# Patient Record
Sex: MALE | Race: WHITE | Employment: OTHER | URBAN - METROPOLITAN AREA
[De-identification: names, ages, dates, MRNs, and addresses within clinical notes are randomized per-mention and may not be internally consistent; named-entity substitution may affect disease eponyms.]

---

## 2018-11-01 PROBLEM — E87.1 HYPONATREMIA: Status: ACTIVE | Noted: 2018-11-01

## 2018-11-01 PROBLEM — R06.02 SHORTNESS OF BREATH: Status: ACTIVE | Noted: 2018-11-01

## 2018-11-02 PROBLEM — M86.10 ACUTE OSTEOMYELITIS (HCC): Status: ACTIVE | Noted: 2018-11-02

## 2018-12-09 PROBLEM — A41.9 SEPSIS (HCC): Status: ACTIVE | Noted: 2018-12-09

## 2018-12-18 ENCOUNTER — OFFICE VISIT (OUTPATIENT)
Dept: VASCULAR SURGERY | Age: 68
End: 2018-12-18

## 2018-12-18 VITALS
RESPIRATION RATE: 16 BRPM | WEIGHT: 244 LBS | HEIGHT: 66 IN | DIASTOLIC BLOOD PRESSURE: 90 MMHG | BODY MASS INDEX: 39.21 KG/M2 | HEART RATE: 88 BPM | SYSTOLIC BLOOD PRESSURE: 140 MMHG

## 2018-12-18 DIAGNOSIS — I73.9 PAD (PERIPHERAL ARTERY DISEASE) (HCC): Primary | ICD-10-CM

## 2018-12-18 DIAGNOSIS — G82.20 PARAPLEGIA (HCC): ICD-10-CM

## 2018-12-18 NOTE — PROGRESS NOTES
1. Have you been to an emergency room or urgent care clinic since your last visit? NO    Hospitalized since your last visit? If yes, where, when, and reason for visit? NO  2. Have you seen or consulted any other health care providers outside of the [facility] since your last visit including any procedures, health maintenance items. If yes, where, when and reason for visit?  NO

## 2018-12-18 NOTE — PROGRESS NOTES
José Miguel Zimmer    Chief Complaint   Patient presents with    New Patient     PAD       History and Physical    Mr Maite Fowler is referred from a local SNF for findings of moderate PAD of his lower extremities  He is a good historian and explains he had been to see another provider who had noted his leg temperature was cooler on one leg compared to the other and that this prompted the vascular studies    He has limited sensation in his feet due to being a paraplegic but he does deny any pain in his legs/feet    He is on a special air mattress and also elevates his legs/heels and has not had any skin issues or concerns with legs/feet (though he is currently being treated for a sacral ulcer). At his facility he does also receive regular foot care    He is a diabetic but feels it is well managed. He also takes aspirin once a day    Past Medical History:   Diagnosis Date    Anemia     Anxiety     B[facility] affective ([facility]     [facility]     Diabetes ([facility]     DVT (deep venous thrombosis) (HCC)     GERD (gastroesophageal reflux disease)     Hyperlipidemia     Hypertension     MRSA (methicillin resistant staph aureus) culture positive     Sacral wound    Neurogenic bowel     Schizoaffective disorder ([facility]      Patient Active Problem List   Diagnosis Code    Shortness of breath R06.02    Hyponatremia E87.1    Acute osteomyelitis ([facility] M86.10    Sepsis ([facility] A41.9     History reviewed. No pertinent surgical history. Current Outpatient Medications   Medication Sig Dispense Refill    oxyCODONE-acetaminophen (PERCOCET)  mg per tablet Take 1 Tab by mouth every eight (8) hours as needed for Pain. Max Daily Amount: 3 Tabs. 10 Tab 0    vancomycin 100 mg/ml soln oral solution (compounded) Take 1.25 mL by mouth every six (6) hours for 7 days. 35 mL 0    fludrocortisone (FLORINEF) 0.1 mg tablet Take 1 Tab by mouth daily.  30 Tab 0    insulin aspart U-100 (NOVOLOG U-100 INSULIN ASPART) 100 unit/mL injection Low sliding scale 10 mL     insulin glargine (LANTUS) 100 unit/mL injection 20 Units by SubCUTAneous route nightly. 1 Vial 0    aspirin delayed-release 81 mg tablet Take  by mouth daily.  atorvastatin (LIPITOR) 20 mg tablet Take  by mouth daily.  baclofen (LIORESAL) 10 mg tablet Take  by mouth three (3) times daily.  bisacodyl (BISCOLAX) 10 mg suppository Insert 10 mg into rectum daily.  DULoxetine (CYMBALTA) 60 mg capsule Take 60 mg by mouth daily.  ferrous sulfate 325 mg (65 mg iron) tablet Take  by mouth Daily (before breakfast).  gabapentin (NEURONTIN) 800 mg tablet Take  by mouth three (3) times daily.  heparin sodium,porcine (HEPARIN, PORCINE,) 5,000 unit/mL syrg by Injection route.  lurasidone (LATUDA) 60 mg tab tablet Take  by mouth.  melatonin 5 mg cap capsule Take 5 mg by mouth nightly.  magnesium hydroxide (GALVAN MILK OF MAGNESIA) 400 mg/5 mL suspension Take 30 mL by mouth daily as needed for Constipation.  raNITIdine (ZANTAC) 300 mg tab Take  by mouth two (2) times a day.  tolterodine ER (DETROL LA) 4 mg ER capsule Take 4 mg by mouth daily.  cyanocobalamin (VITAMIN B-12) 100 mcg tablet Take 100 mcg by mouth daily.  pyridoxine, vitamin B6, (VITAMIN B-6) 50 mg tablet Take  by mouth daily.  ascorbic acid, vitamin C, (VITAMIN C) 500 mg tablet Take  by mouth.        Allergies   Allergen Reactions    Bactrim [Sulfamethoxazole-Trimethoprim] Other (comments)     Hyperkalemic emergency and acute kidney injury    Lisinopril Angioedema     Throat swelling/Difficulty breathing     Social History     Socioeconomic History    Marital status: SINGLE     Spouse name: Not on file    Number of children: Not on file    Years of education: Not on file    Highest education level: Not on file   Social Needs    Financial resource strain: Not on file    Food insecurity - worry: Not on file    Food insecurity - inability: Not on file   Trak needs - medical: Not on file   Trak needs - non-medical: Not on file   Occupational History    Not on file   Tobacco Use    Smoking status: Never Smoker    Smokeless tobacco: Never Used   Substance and Sexual Activity    Alcohol use: No     Frequency: Never    Drug use: Not on file    Sexual activity: Not on file   Other Topics Concern    Not on file   Social History Narrative    Not on file      History reviewed. No pertinent family history. Review of Systems    Review of Systems - History obtained from chart review and the patient  General ROS: negative  Psychological ROS: negative  Ophthalmic ROS: negative  Respiratory ROS: negative  Cardiovascular ROS: negative  Gastrointestinal ROS: c diff infection  Genito-Urinary ROS: neurogenic bladder  Neurological ROS: paraplegic  Dermatological ROS: sacral pressure ulcer  Vascular ROS: negative     Physical Exam:    Visit Vitals  /90 (BP 1 Location: Left arm, BP Patient Position: Sitting)   Pulse 88   Resp 16   Ht 5' 6\" (1.676 m)   Wt 244 lb (110.7 kg)   BMI 39.38 kg/m²      General:  Alert, cooperative, no distress. Head:  Normocephalic, without obvious abnormality, atraumatic. Eyes:    Conjunctivae/corneas clear. Pupils equal, round, reactive to light. Extraocular movements intact. Extremities: No lower leg edema   Pulses: Distal pulses nopalpable. There is slight coolness to right lower leg/foot compared to left   Skin: No ischemic skin changes/no ulcers of feet or heels     Vascular studies:  Studies from an outlying facility demonstrate moderate PAD bialterally. 0.75 kristyn on right and 0.86 on left, with disease level mostly estimated at popliteal/tibial level    Impression and Plan:  1. PAD (peripheral artery disease) ([address]    2. Paraplegia (HCC)      No orders of the defined types were placed in this encounter. I reviewed the results with Mr Watt Asmita.  Without associated symptoms, goals of care at this time are maximize medical management of risk factors for PAD, and continue good efforts to minimize pressure points and good attention to regular foot care for prevention  Should any of these type of problems develop, then refer back for discussion for intervention  For now, may follow up prn    Follow-up Disposition:  Return if symptoms worsen or fail to improve. MICKI Steward    Portions of this note have been created using voice recognition software.

## 2019-02-23 PROBLEM — A49.9 UTI (URINARY TRACT INFECTION), BACTERIAL: Status: ACTIVE | Noted: 2019-02-23

## 2019-02-23 PROBLEM — R65.20 SEVERE SEPSIS (HCC): Status: ACTIVE | Noted: 2019-02-23

## 2019-02-23 PROBLEM — A41.9 SEVERE SEPSIS (HCC): Status: ACTIVE | Noted: 2019-02-23

## 2019-02-23 PROBLEM — N39.0 UTI (URINARY TRACT INFECTION), BACTERIAL: Status: ACTIVE | Noted: 2019-02-23

## 2019-08-12 ENCOUNTER — HOSPITAL ENCOUNTER (OUTPATIENT)
Dept: LAB | Age: 69
Discharge: HOME | End: 2019-08-12

## 2019-08-12 LAB
ALBUMIN SERPL-MCNC: 2.6 G/DL (ref 3.4–5)
ALBUMIN/GLOB SERPL: 0.7 {RATIO} (ref 0.8–1.7)
ALP SERPL-CCNC: 157 U/L (ref 45–117)
ALT SERPL-CCNC: 28 U/L (ref 16–61)
ANION GAP SERPL CALC-SCNC: 11 MMOL/L (ref 3–18)
AST SERPL-CCNC: 28 U/L (ref 10–38)
BASOPHILS # BLD: 0 K/UL (ref 0–0.1)
BASOPHILS NFR BLD: 0 % (ref 0–2)
BILIRUB SERPL-MCNC: 0.4 MG/DL (ref 0.2–1)
BUN SERPL-MCNC: 25 MG/DL (ref 7–18)
BUN/CREAT SERPL: 17 (ref 12–20)
CALCIUM SERPL-MCNC: 7.6 MG/DL (ref 8.5–10.1)
CHLORIDE SERPL-SCNC: 98 MMOL/L (ref 100–111)
CO2 SERPL-SCNC: 23 MMOL/L (ref 21–32)
CREAT SERPL-MCNC: 1.48 MG/DL (ref 0.6–1.3)
DIFFERENTIAL METHOD BLD: ABNORMAL
EOSINOPHIL # BLD: 0 K/UL (ref 0–0.4)
EOSINOPHIL NFR BLD: 0 % (ref 0–5)
ERYTHROCYTE [DISTWIDTH] IN BLOOD BY AUTOMATED COUNT: 16.5 % (ref 11.6–14.5)
GLOBULIN SER CALC-MCNC: 3.9 G/DL (ref 2–4)
GLUCOSE SERPL-MCNC: 174 MG/DL (ref 74–99)
HCT VFR BLD AUTO: 33.3 % (ref 36–48)
HGB BLD-MCNC: 10.4 G/DL (ref 13–16)
LYMPHOCYTES # BLD: 2.1 K/UL (ref 0.9–3.6)
LYMPHOCYTES NFR BLD: 9 % (ref 21–52)
MCH RBC QN AUTO: 28 PG (ref 24–34)
MCHC RBC AUTO-ENTMCNC: 31.2 G/DL (ref 31–37)
MCV RBC AUTO: 89.8 FL (ref 74–97)
MONOCYTES # BLD: 1.8 K/UL (ref 0.05–1.2)
MONOCYTES NFR BLD: 8 % (ref 3–10)
NEUTS SEG # BLD: 18.6 K/UL (ref 1.8–8)
NEUTS SEG NFR BLD: 83 % (ref 40–73)
PLATELET # BLD AUTO: 252 K/UL (ref 135–420)
PMV BLD AUTO: 10.4 FL (ref 9.2–11.8)
POTASSIUM SERPL-SCNC: 3.3 MMOL/L (ref 3.5–5.5)
PROT SERPL-MCNC: 6.5 G/DL (ref 6.4–8.2)
RBC # BLD AUTO: 3.71 M/UL (ref 4.7–5.5)
SODIUM SERPL-SCNC: 132 MMOL/L (ref 136–145)
WBC # BLD AUTO: 22.6 K/UL (ref 4.6–13.2)

## 2019-08-12 PROCEDURE — 85025 COMPLETE CBC W/AUTO DIFF WBC: CPT

## 2019-08-12 PROCEDURE — 81001 URINALYSIS AUTO W/SCOPE: CPT

## 2019-08-12 PROCEDURE — 80053 COMPREHEN METABOLIC PANEL: CPT

## 2019-08-13 LAB
APPEARANCE UR: ABNORMAL
BACTERIA URNS QL MICRO: ABNORMAL /HPF
BILIRUB UR QL: NEGATIVE
COLOR UR: YELLOW
GLUCOSE UR STRIP.AUTO-MCNC: NEGATIVE MG/DL
HGB UR QL STRIP: ABNORMAL
KETONES UR QL STRIP.AUTO: NEGATIVE MG/DL
LEUKOCYTE ESTERASE UR QL STRIP.AUTO: ABNORMAL
NITRITE UR QL STRIP.AUTO: NEGATIVE
PH UR STRIP: 6.5 [PH] (ref 5–8)
PROT UR STRIP-MCNC: 100 MG/DL
RBC #/AREA URNS HPF: ABNORMAL /HPF (ref 0–5)
SP GR UR REFRACTOMETRY: 1.01 (ref 1–1.03)
UROBILINOGEN UR QL STRIP.AUTO: 0.2 EU/DL (ref 0.2–1)
WBC URNS QL MICRO: ABNORMAL /HPF (ref 0–4)

## 2019-10-26 ENCOUNTER — HOSPITAL ENCOUNTER (OUTPATIENT)
Dept: LAB | Age: 69
Discharge: HOME | End: 2019-10-26

## 2019-10-26 PROCEDURE — 81001 URINALYSIS AUTO W/SCOPE: CPT

## 2019-10-26 PROCEDURE — 87186 SC STD MICRODIL/AGAR DIL: CPT

## 2019-10-26 PROCEDURE — 87077 CULTURE AEROBIC IDENTIFY: CPT

## 2019-10-26 PROCEDURE — 87086 URINE CULTURE/COLONY COUNT: CPT

## 2019-10-28 LAB
APPEARANCE UR: ABNORMAL
BACTERIA URNS QL MICRO: NEGATIVE /HPF
BILIRUB UR QL: NEGATIVE
COLOR UR: YELLOW
GLUCOSE UR STRIP.AUTO-MCNC: NEGATIVE MG/DL
HGB UR QL STRIP: ABNORMAL
KETONES UR QL STRIP.AUTO: NEGATIVE MG/DL
LEUKOCYTE ESTERASE UR QL STRIP.AUTO: NEGATIVE
NITRITE UR QL STRIP.AUTO: POSITIVE
PH UR STRIP: 6 [PH] (ref 5–8)
PROT UR STRIP-MCNC: >300 MG/DL
RBC #/AREA URNS HPF: NORMAL /HPF (ref 0–5)
SP GR UR REFRACTOMETRY: 1.01 (ref 1–1.03)
UROBILINOGEN UR QL STRIP.AUTO: 0.2 EU/DL (ref 0.2–1)
WBC URNS QL MICRO: NORMAL /HPF (ref 0–4)

## 2019-10-29 LAB
BACTERIA SPEC CULT: ABNORMAL
BACTERIA SPEC CULT: ABNORMAL
SERVICE CMNT-IMP: ABNORMAL